# Patient Record
Sex: FEMALE | Race: WHITE
[De-identification: names, ages, dates, MRNs, and addresses within clinical notes are randomized per-mention and may not be internally consistent; named-entity substitution may affect disease eponyms.]

---

## 2017-08-05 ENCOUNTER — HEALTH MAINTENANCE LETTER (OUTPATIENT)
Age: 22
End: 2017-08-05

## 2017-11-25 ENCOUNTER — HEALTH MAINTENANCE LETTER (OUTPATIENT)
Age: 22
End: 2017-11-25

## 2019-05-28 ENCOUNTER — ALLIED HEALTH/NURSE VISIT (OUTPATIENT)
Dept: FAMILY MEDICINE | Facility: OTHER | Age: 24
End: 2019-05-28

## 2019-05-28 DIAGNOSIS — Z11.1 SCREENING EXAMINATION FOR PULMONARY TUBERCULOSIS: Primary | ICD-10-CM

## 2019-05-28 PROCEDURE — 99207 ZZC NO CHARGE NURSE ONLY: CPT

## 2019-05-28 PROCEDURE — 86580 TB INTRADERMAL TEST: CPT

## 2019-05-28 NOTE — NURSING NOTE
Chief Complaint   Patient presents with     Mantoux Administration     The patient is asked the following questions today and these are her answers:    -Have you had a mantoux administered in the past 30 days?    No  -Have you had a previous positive Mantoux.  No  -Have you received BCG in the past.  No  -Have you had a live vaccine  (MMR, Varicella, OPV, Yellow Fever) in the last 6 weeks.  No  -Have you had and active  viral or bacterial infection in the past 6 weeks.  No  -Have you received corticosteroids or immunosuppressive agents in the past 6 weeks.  No  -Have you been diagnosed with HIV?  No  -Do you have a maglinancy?  No     Ashlie Gil CMA ([facility])

## 2019-05-30 ENCOUNTER — ALLIED HEALTH/NURSE VISIT (OUTPATIENT)
Dept: FAMILY MEDICINE | Facility: OTHER | Age: 24
End: 2019-05-30

## 2019-05-30 DIAGNOSIS — Z11.1 SCREENING EXAMINATION FOR PULMONARY TUBERCULOSIS: Primary | ICD-10-CM

## 2019-05-30 LAB
PPDINDURATION: 0 MM (ref 0–5)
PPDREDNESS: 0 MM

## 2019-05-30 PROCEDURE — 99207 ZZC NO CHARGE NURSE ONLY: CPT

## 2019-05-30 NOTE — PROGRESS NOTES
Pt here for mantoux at 1549.    Mantoux result:  Lab Results   Component Value Date    PPDREDNESS 0 05/30/2019    PPDINDURATIO 0 05/30/2019     Is induration greater than 5mm?  No     Jaymie Lizarraga RN on 5/30/2019 at 3:53 PM

## 2019-06-17 ENCOUNTER — ALLIED HEALTH/NURSE VISIT (OUTPATIENT)
Dept: FAMILY MEDICINE | Facility: OTHER | Age: 24
End: 2019-06-17

## 2019-06-17 DIAGNOSIS — R76.11 MANTOUX: POSITIVE: Primary | ICD-10-CM

## 2019-06-17 DIAGNOSIS — Z11.1 VISIT FOR MANTOUX TEST: ICD-10-CM

## 2019-06-17 PROCEDURE — 99207 ZZC NO CHARGE NURSE ONLY: CPT

## 2019-06-17 PROCEDURE — 86580 TB INTRADERMAL TEST: CPT

## 2019-06-17 NOTE — PROGRESS NOTES
The patient is asked the following questions today and these are her answers:    -Have you had a mantoux administered in the past 30 days?    Yes  -Have you had a previous positive Mantoux.  No  -Have you received BCG in the past.  No  -Have you had a live vaccine  (MMR, Varicella, OPV, Yellow Fever) in the last 6 weeks.  No  -Have you had and active  viral or bacterial infection in the past 6 weeks.  No  -Have you received corticosteroids or immunosuppressive agents in the past 6 weeks.  No  -Have you been diagnosed with HIV?  No  -Do you have a maglinancy?  No

## 2019-06-19 ENCOUNTER — ALLIED HEALTH/NURSE VISIT (OUTPATIENT)
Dept: FAMILY MEDICINE | Facility: OTHER | Age: 24
End: 2019-06-19

## 2019-06-19 DIAGNOSIS — Z11.1 SCREENING EXAMINATION FOR PULMONARY TUBERCULOSIS: Primary | ICD-10-CM

## 2019-06-19 LAB
PPDINDURATION: 0 MM (ref 0–5)
PPDREDNESS: 0 MM

## 2019-06-19 PROCEDURE — 99207 ZZC NO CHARGE NURSE ONLY: CPT

## 2019-06-19 NOTE — PROGRESS NOTES
Patient here for mantoux read.  Mantoux result: at 4:17 pm  Lab Results   Component Value Date    PPDREDNESS 0 06/19/2019    PPDINDURATIO 0 06/19/2019     Is induration greater than 5mm?  No     Otto Cardona, RN, BSN